# Patient Record
Sex: FEMALE | Race: WHITE | URBAN - METROPOLITAN AREA
[De-identification: names, ages, dates, MRNs, and addresses within clinical notes are randomized per-mention and may not be internally consistent; named-entity substitution may affect disease eponyms.]

---

## 2021-02-04 ENCOUNTER — NEW PATIENT (OUTPATIENT)
Dept: URBAN - METROPOLITAN AREA CLINIC 26 | Facility: CLINIC | Age: 71
End: 2021-02-04

## 2021-02-04 VITALS
DIASTOLIC BLOOD PRESSURE: 81 MMHG | WEIGHT: 111 LBS | HEIGHT: 60 IN | HEART RATE: 74 BPM | BODY MASS INDEX: 21.79 KG/M2 | SYSTOLIC BLOOD PRESSURE: 123 MMHG

## 2021-02-04 DIAGNOSIS — H43.811: ICD-10-CM

## 2021-02-04 DIAGNOSIS — H35.373: ICD-10-CM

## 2021-02-04 PROCEDURE — 92201 OPSCPY EXTND RTA DRAW UNI/BI: CPT

## 2021-02-04 PROCEDURE — 92004 COMPRE OPH EXAM NEW PT 1/>: CPT

## 2021-02-04 PROCEDURE — 92134 CPTRZ OPH DX IMG PST SGM RTA: CPT

## 2021-02-04 ASSESSMENT — VISUAL ACUITY
OS_SC: 20/30-1
OD_SC: 20/25-2

## 2021-02-04 ASSESSMENT — TONOMETRY
OD_IOP_MMHG: 17
OS_IOP_MMHG: 18

## 2021-03-04 ENCOUNTER — FOLLOW UP (OUTPATIENT)
Dept: URBAN - METROPOLITAN AREA CLINIC 26 | Facility: CLINIC | Age: 71
End: 2021-03-04

## 2021-03-04 VITALS — HEIGHT: 55 IN

## 2021-03-04 DIAGNOSIS — H35.373: ICD-10-CM

## 2021-03-04 DIAGNOSIS — H53.8: ICD-10-CM

## 2021-03-04 DIAGNOSIS — H43.811: ICD-10-CM

## 2021-03-04 PROCEDURE — 92014 COMPRE OPH EXAM EST PT 1/>: CPT

## 2021-03-04 PROCEDURE — 92250 FUNDUS PHOTOGRAPHY W/I&R: CPT

## 2021-03-04 ASSESSMENT — VISUAL ACUITY
OD_SC: 20/30-1
OS_SC: 20/25-2

## 2021-03-04 ASSESSMENT — TONOMETRY
OS_IOP_MMHG: 15
OD_IOP_MMHG: 12

## 2021-03-18 ENCOUNTER — ADDENDUM (OUTPATIENT)
Dept: URBAN - METROPOLITAN AREA CLINIC 26 | Facility: CLINIC | Age: 71
End: 2021-03-18

## 2021-03-24 ENCOUNTER — ADDENDUM (OUTPATIENT)
Dept: URBAN - METROPOLITAN AREA CLINIC 26 | Facility: CLINIC | Age: 71
End: 2021-03-24

## 2021-04-19 ENCOUNTER — IMPORTED ENCOUNTER (OUTPATIENT)
Dept: URBAN - METROPOLITAN AREA CLINIC 31 | Facility: CLINIC | Age: 71
End: 2021-04-19

## 2021-04-19 PROBLEM — H43.811: Noted: 2021-04-19

## 2021-04-19 PROCEDURE — 92015 DETERMINE REFRACTIVE STATE: CPT

## 2021-04-19 PROCEDURE — 92004 COMPRE OPH EXAM NEW PT 1/>: CPT

## 2021-04-19 PROCEDURE — 92250 FUNDUS PHOTOGRAPHY W/I&R: CPT

## 2021-10-19 ENCOUNTER — IMPORTED ENCOUNTER (OUTPATIENT)
Dept: URBAN - METROPOLITAN AREA CLINIC 31 | Facility: CLINIC | Age: 71
End: 2021-10-19

## 2021-10-19 PROBLEM — H43.813: Noted: 2021-10-19

## 2021-10-19 PROBLEM — Z96.1: Noted: 2021-10-19

## 2021-10-19 PROCEDURE — 99214 OFFICE O/P EST MOD 30 MIN: CPT

## 2021-10-19 PROCEDURE — 92250 FUNDUS PHOTOGRAPHY W/I&R: CPT

## 2022-04-02 ASSESSMENT — VISUAL ACUITY
OS_CC: 20/30
OS_CC: 20/40
OD_CC: 20/40
OU_SC: J316''
OD_CC: 20/30-1
OD_SC: J316''
OU_CC: 20/2516''
OS_PH: SC 20/30
OD_PH: SC 20/30
OS_SC: J516''

## 2022-04-02 ASSESSMENT — TONOMETRY
OD_IOP_MMHG: 16
OS_IOP_MMHG: 12
OS_IOP_MMHG: 16

## 2023-10-18 ENCOUNTER — COMPREHENSIVE EXAM (OUTPATIENT)
Dept: URBAN - METROPOLITAN AREA CLINIC 31 | Facility: CLINIC | Age: 73
End: 2023-10-18

## 2023-10-18 DIAGNOSIS — H43.813: ICD-10-CM

## 2023-10-18 DIAGNOSIS — Z96.1: ICD-10-CM

## 2023-10-18 PROCEDURE — 92014 COMPRE OPH EXAM EST PT 1/>: CPT

## 2023-10-18 PROCEDURE — 92250 FUNDUS PHOTOGRAPHY W/I&R: CPT

## 2023-10-18 ASSESSMENT — VISUAL ACUITY
OS_SC: J3
OD_SC: J3
OD_SC: 20/30
OS_SC: 20/30

## 2023-10-18 ASSESSMENT — TONOMETRY
OS_IOP_MMHG: 16
OD_IOP_MMHG: 22

## 2023-10-20 ENCOUNTER — APPOINTMENT (RX ONLY)
Dept: URBAN - METROPOLITAN AREA CLINIC 121 | Facility: CLINIC | Age: 73
Setting detail: DERMATOLOGY
End: 2023-10-20

## 2023-10-20 DIAGNOSIS — L81.4 OTHER MELANIN HYPERPIGMENTATION: ICD-10-CM

## 2023-10-20 DIAGNOSIS — L82.1 OTHER SEBORRHEIC KERATOSIS: ICD-10-CM

## 2023-10-20 DIAGNOSIS — R23.3 SPONTANEOUS ECCHYMOSES: ICD-10-CM

## 2023-10-20 DIAGNOSIS — D18.0 HEMANGIOMA: ICD-10-CM

## 2023-10-20 DIAGNOSIS — Z71.89 OTHER SPECIFIED COUNSELING: ICD-10-CM

## 2023-10-20 DIAGNOSIS — D49.2 NEOPLASM OF UNSPECIFIED BEHAVIOR OF BONE, SOFT TISSUE, AND SKIN: ICD-10-CM

## 2023-10-20 PROBLEM — D18.01 HEMANGIOMA OF SKIN AND SUBCUTANEOUS TISSUE: Status: ACTIVE | Noted: 2023-10-20

## 2023-10-20 PROCEDURE — ? BIOPSY BY SHAVE METHOD

## 2023-10-20 PROCEDURE — ? SUNSCREEN RECOMMENDATIONS

## 2023-10-20 PROCEDURE — ? DEFER

## 2023-10-20 PROCEDURE — 11102 TANGNTL BX SKIN SINGLE LES: CPT

## 2023-10-20 PROCEDURE — 99203 OFFICE O/P NEW LOW 30 MIN: CPT | Mod: 25

## 2023-10-20 ASSESSMENT — LOCATION ZONE DERM
LOCATION ZONE: EYELID
LOCATION ZONE: TRUNK
LOCATION ZONE: FACE
LOCATION ZONE: ARM

## 2023-10-20 ASSESSMENT — LOCATION SIMPLE DESCRIPTION DERM
LOCATION SIMPLE: RIGHT FOREARM
LOCATION SIMPLE: RIGHT SUPERIOR EYELID
LOCATION SIMPLE: LEFT FOREARM
LOCATION SIMPLE: RIGHT UPPER BACK
LOCATION SIMPLE: ABDOMEN
LOCATION SIMPLE: LEFT UPPER BACK
LOCATION SIMPLE: LEFT CHEEK

## 2023-10-20 ASSESSMENT — LOCATION DETAILED DESCRIPTION DERM
LOCATION DETAILED: PERIUMBILICAL SKIN
LOCATION DETAILED: RIGHT PROXIMAL DORSAL FOREARM
LOCATION DETAILED: LEFT INFERIOR MEDIAL MALAR CHEEK
LOCATION DETAILED: RIGHT LATERAL SUPERIOR EYELID
LOCATION DETAILED: LEFT MID-UPPER BACK
LOCATION DETAILED: RIGHT MEDIAL UPPER BACK
LOCATION DETAILED: RIGHT MID-UPPER BACK
LOCATION DETAILED: LEFT PROXIMAL DORSAL FOREARM

## 2023-10-20 NOTE — PROCEDURE: BIOPSY BY SHAVE METHOD
Body Location Override (Optional - Billing Will Still Be Based On Selected Body Map Location If Applicable): NLF
Detail Level: Detailed
Depth Of Biopsy: dermis
Was A Bandage Applied: Yes
Size Of Lesion In Cm: 0
Biopsy Type: H and E
Biopsy Method: Dermablade
Anesthesia Type: 1% lidocaine without epinephrine
Hemostasis: Aluminum Chloride
Wound Care: Vaseline
Dressing: pressure dressing with telfa
Destruction After The Procedure: No
Type Of Destruction Used: Curettage
Cryotherapy Text: The wound bed was treated with cryotherapy after the biopsy was performed.
Electrodesiccation Text: The wound bed was treated with electrodesiccation after the biopsy was performed.
Electrodesiccation And Curettage Text: The wound bed was treated with electrodesiccation and curettage after the biopsy was performed.
Silver Nitrate Text: The wound bed was treated with silver nitrate after the biopsy was performed.
Lab: [address] [place] [address] [facility]
Consent: Written consent was obtained and risks were reviewed including but not limited to scarring, infection, bleeding, scabbing, incomplete removal, nerve damage and allergy to anesthesia.
Post-Care Instructions: I reviewed with the patient in detail post-care instructions. Patient is to keep the biopsy site dry overnight, and then apply bacitracin twice daily until healed. Patient may apply hydrogen peroxide soaks to remove any crusting.
Notification Instructions: Patient will be notified of biopsy results. However, patient instructed to call the office if not contacted within 2 weeks.
Billing Type: [employer]
Information: Selecting Yes will display possible errors in your note based on the variables you have selected. This validation is only offered as a suggestion for you. PLEASE NOTE THAT THE VALIDATION TEXT WILL BE REMOVED WHEN YOU FINALIZE YOUR NOTE. IF YOU WANT TO FAX A PRELIMINARY NOTE YOU WILL NEED TO TOGGLE THIS TO 'NO' IF YOU DO NOT WANT IT IN YOUR FAXED NOTE.

## 2023-10-20 NOTE — PROCEDURE: DEFER
Reason To Defer Override: defer to dr Rashad León for evaluation
X Size Of Lesion In Cm (Optional): 0
Introduction Text (Please End With A Colon): ;
Detail Level: Detailed

## 2024-01-29 ENCOUNTER — CONSULTATION/EVALUATION (OUTPATIENT)
Dept: URBAN - METROPOLITAN AREA CLINIC 29 | Facility: CLINIC | Age: 74
End: 2024-01-29

## 2024-01-29 DIAGNOSIS — H40.051: ICD-10-CM

## 2024-01-29 DIAGNOSIS — H26.40: ICD-10-CM

## 2024-01-29 DIAGNOSIS — H43.813: ICD-10-CM

## 2024-01-29 DIAGNOSIS — Z96.1: ICD-10-CM

## 2024-01-29 PROCEDURE — 99214 OFFICE O/P EST MOD 30 MIN: CPT

## 2024-01-29 ASSESSMENT — KERATOMETRY
OS_AXISANGLE2_DEGREES: 110
OD_AXISANGLE2_DEGREES: 95
OS_K2POWER_DIOPTERS: 42.50
OD_K1POWER_DIOPTERS: 42.25
OD_K2POWER_DIOPTERS: 42.75
OS_AXISANGLE_DEGREES: 020
OD_AXISANGLE_DEGREES: 005
OS_K1POWER_DIOPTERS: 42.25

## 2024-01-29 ASSESSMENT — VISUAL ACUITY
OS_PH: 20/50+1
OS_BAT: 20/400
OD_SC: 20/40-1
OD_PH: 20/30-1
OS_SC: 20/70+1

## 2024-01-29 ASSESSMENT — TONOMETRY
OS_IOP_MMHG: 18
OD_IOP_MMHG: 18

## 2024-02-27 ENCOUNTER — SURGERY/PROCEDURE (OUTPATIENT)
Dept: URBAN - METROPOLITAN AREA SURGERY 17 | Facility: SURGERY | Age: 74
End: 2024-02-27

## 2024-02-27 DIAGNOSIS — H26.491: ICD-10-CM

## 2024-02-27 PROCEDURE — 66821 AFTER CATARACT LASER SURGERY: CPT

## 2024-02-27 ASSESSMENT — KERATOMETRY
OS_AXISANGLE2_DEGREES: 110
OS_AXISANGLE_DEGREES: 020
OS_K2POWER_DIOPTERS: 42.50
OD_AXISANGLE_DEGREES: 005
OD_K1POWER_DIOPTERS: 42.25
OD_AXISANGLE2_DEGREES: 95
OS_K1POWER_DIOPTERS: 42.25
OD_K2POWER_DIOPTERS: 42.75

## 2024-03-07 ENCOUNTER — POST-OP (OUTPATIENT)
Dept: URBAN - METROPOLITAN AREA CLINIC 31 | Facility: CLINIC | Age: 74
End: 2024-03-07

## 2024-03-07 DIAGNOSIS — Z98.890: ICD-10-CM

## 2024-03-07 DIAGNOSIS — H35.363: ICD-10-CM

## 2024-03-07 PROCEDURE — 92134 CPTRZ OPH DX IMG PST SGM RTA: CPT

## 2024-03-07 PROCEDURE — 99024 POSTOP FOLLOW-UP VISIT: CPT

## 2024-03-07 ASSESSMENT — KERATOMETRY
OD_K2POWER_DIOPTERS: 42.75
OS_AXISANGLE_DEGREES: 020
OD_K1POWER_DIOPTERS: 42.25
OD_AXISANGLE_DEGREES: 005
OS_K2POWER_DIOPTERS: 42.50
OS_K1POWER_DIOPTERS: 42.25
OD_AXISANGLE2_DEGREES: 95
OS_AXISANGLE2_DEGREES: 110

## 2024-03-07 ASSESSMENT — VISUAL ACUITY
OS_SC: 20/60-2
OD_SC: J3
OS_SC: J5
OS_PH: 20/40
OD_SC: 20/40+2

## 2024-03-07 ASSESSMENT — TONOMETRY
OS_IOP_MMHG: 18
OD_IOP_MMHG: 18

## 2024-04-24 ENCOUNTER — CONTACT LENSES/GLASSES VISIT (OUTPATIENT)
Dept: URBAN - METROPOLITAN AREA CLINIC 31 | Facility: CLINIC | Age: 74
End: 2024-04-24

## 2024-04-24 DIAGNOSIS — H53.8: ICD-10-CM

## 2024-04-24 DIAGNOSIS — H04.123: ICD-10-CM

## 2024-04-24 DIAGNOSIS — H35.363: ICD-10-CM

## 2024-04-24 PROCEDURE — 92250 FUNDUS PHOTOGRAPHY W/I&R: CPT

## 2024-04-24 PROCEDURE — 99204 OFFICE O/P NEW MOD 45 MIN: CPT

## 2024-04-24 ASSESSMENT — VISUAL ACUITY
OS_CC: 20/60
OS_PH: 20/40-3
OD_CC: 20/30

## 2024-04-24 ASSESSMENT — KERATOMETRY
OD_AXISANGLE2_DEGREES: 95
OS_AXISANGLE_DEGREES: 020
OS_K2POWER_DIOPTERS: 42.50
OD_K2POWER_DIOPTERS: 42.75
OD_K1POWER_DIOPTERS: 42.25
OD_AXISANGLE_DEGREES: 005
OS_K1POWER_DIOPTERS: 42.25
OS_AXISANGLE2_DEGREES: 110

## 2024-04-24 ASSESSMENT — TONOMETRY: OS_IOP_MMHG: 17

## 2024-05-01 ENCOUNTER — APPOINTMENT (RX ONLY)
Dept: URBAN - METROPOLITAN AREA CLINIC 121 | Facility: CLINIC | Age: 74
Setting detail: DERMATOLOGY
End: 2024-05-01

## 2024-05-01 DIAGNOSIS — Z85.828 PERSONAL HISTORY OF OTHER MALIGNANT NEOPLASM OF SKIN: ICD-10-CM

## 2024-05-01 DIAGNOSIS — L82.1 OTHER SEBORRHEIC KERATOSIS: ICD-10-CM

## 2024-05-01 DIAGNOSIS — L81.4 OTHER MELANIN HYPERPIGMENTATION: ICD-10-CM

## 2024-05-01 DIAGNOSIS — Z71.89 OTHER SPECIFIED COUNSELING: ICD-10-CM

## 2024-05-01 DIAGNOSIS — D18.0 HEMANGIOMA: ICD-10-CM

## 2024-05-01 DIAGNOSIS — L57.0 ACTINIC KERATOSIS: ICD-10-CM

## 2024-05-01 PROBLEM — D18.01 HEMANGIOMA OF SKIN AND SUBCUTANEOUS TISSUE: Status: ACTIVE | Noted: 2024-05-01

## 2024-05-01 PROCEDURE — ? LIQUID NITROGEN

## 2024-05-01 PROCEDURE — ? TREATMENT REGIMEN

## 2024-05-01 PROCEDURE — ? COUNSELING

## 2024-05-01 PROCEDURE — 17003 DESTRUCT PREMALG LES 2-14: CPT

## 2024-05-01 PROCEDURE — 17000 DESTRUCT PREMALG LESION: CPT

## 2024-05-01 PROCEDURE — 99213 OFFICE O/P EST LOW 20 MIN: CPT | Mod: 25

## 2024-05-01 ASSESSMENT — LOCATION SIMPLE DESCRIPTION DERM
LOCATION SIMPLE: RIGHT BREAST
LOCATION SIMPLE: RIGHT HAND
LOCATION SIMPLE: CHEST
LOCATION SIMPLE: ABDOMEN
LOCATION SIMPLE: UPPER BACK
LOCATION SIMPLE: LEFT TEMPLE
LOCATION SIMPLE: RIGHT CHEEK

## 2024-05-01 ASSESSMENT — LOCATION DETAILED DESCRIPTION DERM
LOCATION DETAILED: LEFT CENTRAL TEMPLE
LOCATION DETAILED: RIGHT RADIAL DORSAL HAND
LOCATION DETAILED: INFERIOR THORACIC SPINE
LOCATION DETAILED: MIDDLE STERNUM
LOCATION DETAILED: RIGHT MEDIAL MALAR CHEEK
LOCATION DETAILED: LOWER STERNUM
LOCATION DETAILED: SUBXIPHOID
LOCATION DETAILED: SUPERIOR THORACIC SPINE
LOCATION DETAILED: RIGHT INFRAMAMMARY CREASE (INNER QUADRANT)

## 2024-05-01 ASSESSMENT — LOCATION ZONE DERM
LOCATION ZONE: HAND
LOCATION ZONE: FACE
LOCATION ZONE: TRUNK

## 2024-05-01 NOTE — PROCEDURE: LIQUID NITROGEN
Duration Of Freeze Thaw-Cycle (Seconds): 0
Post-Care Instructions: I reviewed with the patient in detail post-care instructions. Patient is to wear sunprotection, and avoid picking at any of the treated lesions. Pt may apply Vaseline to crusted or scabbing areas.
Show Aperture Variable?: Yes
Consent: The patient's consent was obtained including but not limited to risks of crusting, scabbing, blistering, scarring, darker or lighter pigmentary change, recurrence, incomplete removal and infection.
Render Post-Care Instructions In Note?: no
Detail Level: Detailed
Number Of Freeze-Thaw Cycles: 2 freeze-thaw cycles

## 2024-05-15 ENCOUNTER — NEW PATIENT (OUTPATIENT)
Dept: URBAN - METROPOLITAN AREA CLINIC 26 | Facility: CLINIC | Age: 74
End: 2024-05-15

## 2024-05-15 VITALS
BODY MASS INDEX: 21.79 KG/M2 | HEART RATE: 71 BPM | WEIGHT: 111 LBS | SYSTOLIC BLOOD PRESSURE: 153 MMHG | HEIGHT: 60 IN | DIASTOLIC BLOOD PRESSURE: 83 MMHG

## 2024-05-15 DIAGNOSIS — D31.32: ICD-10-CM

## 2024-05-15 DIAGNOSIS — H35.342: ICD-10-CM

## 2024-05-15 DIAGNOSIS — H43.811: ICD-10-CM

## 2024-05-15 DIAGNOSIS — H53.8: ICD-10-CM

## 2024-05-15 DIAGNOSIS — H35.373: ICD-10-CM

## 2024-05-15 DIAGNOSIS — H02.831: ICD-10-CM

## 2024-05-15 DIAGNOSIS — H35.3131: ICD-10-CM

## 2024-05-15 DIAGNOSIS — H04.123: ICD-10-CM

## 2024-05-15 DIAGNOSIS — H02.834: ICD-10-CM

## 2024-05-15 DIAGNOSIS — G45.3: ICD-10-CM

## 2024-05-15 PROCEDURE — 92134 CPTRZ OPH DX IMG PST SGM RTA: CPT

## 2024-05-15 PROCEDURE — 92250 FUNDUS PHOTOGRAPHY W/I&R: CPT | Mod: 59

## 2024-05-15 PROCEDURE — 99204 OFFICE O/P NEW MOD 45 MIN: CPT

## 2024-05-15 PROCEDURE — 92235 FLUORESCEIN ANGRPH MLTIFRAME: CPT

## 2024-05-15 ASSESSMENT — TONOMETRY
OD_IOP_MMHG: 14
OS_IOP_MMHG: 11

## 2024-05-15 ASSESSMENT — VISUAL ACUITY
OD_SC: 20/20-1
OS_SC: 20/40

## 2024-08-14 ENCOUNTER — FOLLOW UP (OUTPATIENT)
Dept: URBAN - METROPOLITAN AREA CLINIC 26 | Facility: CLINIC | Age: 74
End: 2024-08-14

## 2024-08-14 VITALS — HEIGHT: 61 IN | WEIGHT: 111 LBS | BODY MASS INDEX: 20.96 KG/M2

## 2024-08-14 DIAGNOSIS — H02.831: ICD-10-CM

## 2024-08-14 DIAGNOSIS — H35.342: ICD-10-CM

## 2024-08-14 DIAGNOSIS — H35.373: ICD-10-CM

## 2024-08-14 DIAGNOSIS — G45.3: ICD-10-CM

## 2024-08-14 DIAGNOSIS — D31.32: ICD-10-CM

## 2024-08-14 DIAGNOSIS — H35.3131: ICD-10-CM

## 2024-08-14 DIAGNOSIS — H53.8: ICD-10-CM

## 2024-08-14 DIAGNOSIS — H02.834: ICD-10-CM

## 2024-08-14 DIAGNOSIS — H04.123: ICD-10-CM

## 2024-08-14 DIAGNOSIS — H43.811: ICD-10-CM

## 2024-08-14 PROCEDURE — 92014 COMPRE OPH EXAM EST PT 1/>: CPT

## 2024-08-14 PROCEDURE — 92134 CPTRZ OPH DX IMG PST SGM RTA: CPT

## 2024-08-14 PROCEDURE — 92250 FUNDUS PHOTOGRAPHY W/I&R: CPT | Mod: 59

## 2024-08-14 ASSESSMENT — VISUAL ACUITY
OD_SC: 20/25
OS_SC: 20/400-2

## 2024-08-14 ASSESSMENT — TONOMETRY
OS_IOP_MMHG: 14
OD_IOP_MMHG: 12

## 2024-11-13 ENCOUNTER — FOLLOW UP (OUTPATIENT)
Dept: URBAN - METROPOLITAN AREA CLINIC 26 | Facility: CLINIC | Age: 74
End: 2024-11-13

## 2024-11-13 DIAGNOSIS — H43.811: ICD-10-CM

## 2024-11-13 DIAGNOSIS — H02.834: ICD-10-CM

## 2024-11-13 DIAGNOSIS — G45.3: ICD-10-CM

## 2024-11-13 DIAGNOSIS — H02.831: ICD-10-CM

## 2024-11-13 DIAGNOSIS — D31.32: ICD-10-CM

## 2024-11-13 DIAGNOSIS — H35.373: ICD-10-CM

## 2024-11-13 DIAGNOSIS — H04.123: ICD-10-CM

## 2024-11-13 DIAGNOSIS — H35.3131: ICD-10-CM

## 2024-11-13 DIAGNOSIS — H35.342: ICD-10-CM

## 2024-11-13 DIAGNOSIS — H53.8: ICD-10-CM

## 2024-11-13 PROCEDURE — 92014 COMPRE OPH EXAM EST PT 1/>: CPT

## 2024-11-13 PROCEDURE — 92134 CPTRZ OPH DX IMG PST SGM RTA: CPT

## 2024-11-13 PROCEDURE — 92250 FUNDUS PHOTOGRAPHY W/I&R: CPT | Mod: 59

## 2025-07-07 ENCOUNTER — FOLLOW UP (OUTPATIENT)
Age: 75
End: 2025-07-07

## 2025-07-07 VITALS — WEIGHT: 111 LBS | BODY MASS INDEX: 20.96 KG/M2 | HEIGHT: 61 IN

## 2025-07-07 DIAGNOSIS — H35.373: ICD-10-CM

## 2025-07-07 DIAGNOSIS — D31.32: ICD-10-CM

## 2025-07-07 DIAGNOSIS — H35.342: ICD-10-CM

## 2025-07-07 DIAGNOSIS — G45.3: ICD-10-CM

## 2025-07-07 DIAGNOSIS — H35.3131: ICD-10-CM

## 2025-07-07 DIAGNOSIS — H04.123: ICD-10-CM

## 2025-07-07 DIAGNOSIS — H43.811: ICD-10-CM

## 2025-07-07 PROCEDURE — 92134 CPTRZ OPH DX IMG PST SGM RTA: CPT

## 2025-07-07 PROCEDURE — 92250 FUNDUS PHOTOGRAPHY W/I&R: CPT | Mod: 59

## 2025-07-07 PROCEDURE — 92012 INTRM OPH EXAM EST PATIENT: CPT

## 2025-07-14 ENCOUNTER — FOLLOW UP (OUTPATIENT)
Age: 75
End: 2025-07-14

## 2025-07-14 DIAGNOSIS — H35.3131: ICD-10-CM

## 2025-07-14 DIAGNOSIS — H35.342: ICD-10-CM

## 2025-07-14 DIAGNOSIS — H43.811: ICD-10-CM

## 2025-07-14 DIAGNOSIS — H35.373: ICD-10-CM

## 2025-07-14 DIAGNOSIS — H02.834: ICD-10-CM

## 2025-07-14 DIAGNOSIS — G45.3: ICD-10-CM

## 2025-07-14 DIAGNOSIS — D31.32: ICD-10-CM

## 2025-07-14 DIAGNOSIS — H04.123: ICD-10-CM

## 2025-07-14 DIAGNOSIS — H02.831: ICD-10-CM

## 2025-07-14 PROCEDURE — 92014 COMPRE OPH EXAM EST PT 1/>: CPT
